# Patient Record
Sex: FEMALE | Race: WHITE | NOT HISPANIC OR LATINO
[De-identification: names, ages, dates, MRNs, and addresses within clinical notes are randomized per-mention and may not be internally consistent; named-entity substitution may affect disease eponyms.]

---

## 2020-01-09 ENCOUNTER — APPOINTMENT (OUTPATIENT)
Dept: MATERNAL FETAL MEDICINE | Facility: CLINIC | Age: 40
End: 2020-01-09
Payer: MEDICAID

## 2020-01-09 VITALS
OXYGEN SATURATION: 100 % | WEIGHT: 125 LBS | HEART RATE: 94 BPM | DIASTOLIC BLOOD PRESSURE: 65 MMHG | SYSTOLIC BLOOD PRESSURE: 110 MMHG | HEIGHT: 61 IN | BODY MASS INDEX: 23.6 KG/M2 | RESPIRATION RATE: 16 BRPM

## 2020-01-09 DIAGNOSIS — Z83.3 FAMILY HISTORY OF DIABETES MELLITUS: ICD-10-CM

## 2020-01-09 DIAGNOSIS — Z82.49 FAMILY HISTORY OF ISCHEMIC HEART DISEASE AND OTHER DISEASES OF THE CIRCULATORY SYSTEM: ICD-10-CM

## 2020-01-09 DIAGNOSIS — Z78.9 OTHER SPECIFIED HEALTH STATUS: ICD-10-CM

## 2020-01-09 DIAGNOSIS — F41.9 ANXIETY DISORDER, UNSPECIFIED: ICD-10-CM

## 2020-01-09 DIAGNOSIS — O09.522 SUPERVISION OF ELDERLY MULTIGRAVIDA, SECOND TRIMESTER: ICD-10-CM

## 2020-01-09 PROCEDURE — 99205 OFFICE O/P NEW HI 60 MIN: CPT

## 2020-01-16 ENCOUNTER — APPOINTMENT (OUTPATIENT)
Dept: ANTEPARTUM | Facility: CLINIC | Age: 40
End: 2020-01-16
Payer: MEDICAID

## 2020-01-16 ENCOUNTER — ASOB RESULT (OUTPATIENT)
Age: 40
End: 2020-01-16

## 2020-01-16 PROCEDURE — 76811 OB US DETAILED SNGL FETUS: CPT

## 2020-02-17 RX ORDER — BUSPIRONE HYDROCHLORIDE 5 MG/1
5 TABLET ORAL 3 TIMES DAILY
Qty: 90 | Refills: 1 | Status: ACTIVE | COMMUNITY
Start: 2020-02-17 | End: 1900-01-01

## 2020-02-18 PROBLEM — O09.522 ADVANCED MATERNAL AGE IN MULTIGRAVIDA, SECOND TRIMESTER: Status: ACTIVE | Noted: 2020-02-18

## 2020-02-18 PROBLEM — Z78.9 DENIES ALCOHOL CONSUMPTION: Status: ACTIVE | Noted: 2020-02-18

## 2020-02-18 PROBLEM — Z78.9 DOES NOT USE ILLICIT DRUGS: Status: ACTIVE | Noted: 2020-02-18

## 2020-02-18 PROBLEM — Z82.49 FAMILY HISTORY OF HYPERTENSION: Status: ACTIVE | Noted: 2020-02-18

## 2020-02-18 PROBLEM — Z83.3 FAMILY HISTORY OF TYPE 2 DIABETES MELLITUS: Status: ACTIVE | Noted: 2020-02-18

## 2020-02-18 PROBLEM — F41.9 ANXIETY: Status: ACTIVE | Noted: 2020-02-17

## 2020-02-18 RX ORDER — INSULIN LISPRO 100 U/ML
100 INJECTION, SOLUTION SUBCUTANEOUS
Refills: 0 | Status: ACTIVE | COMMUNITY

## 2020-02-18 RX ORDER — INSULIN GLARGINE 100 [IU]/ML
INJECTION, SOLUTION SUBCUTANEOUS
Refills: 0 | Status: ACTIVE | COMMUNITY

## 2020-02-18 RX ORDER — BUSPIRONE HCL 5 MG
TABLET ORAL
Refills: 0 | Status: ACTIVE | COMMUNITY

## 2020-02-18 NOTE — ACTIVE PROBLEMS
[Hypertension] : no hypertension [Heart Disease] : no heart disease [Autoimmune Disease] : no autoimmune disease [Renal Disease] : no kidney disease, no UTI [Neurologic Disorder] : no neurologic disorder, no epilepsy [Psychiatric Disorders] : no psychiatric disorders [Hepatic Disorder] : no hepatitis, no liver disease [Thrombophlebitis] : no varicosities, no phlebitis [Thyroid Disorder] : no thyroid dysfunction [Blood Transfusion (___ Ml)] : no history of blood transfusion [Trauma] : no trauma/violence [FreeTextEntry1] : Ms. Gray reports a recent normal ophthalmology evaluation but records are unavailable for review. \par \par

## 2020-02-18 NOTE — SURGICAL HISTORY
[Fibroids] : no fibroids [Abn Paps] : no abnormal pap smears [Breast Disease] : no breast disease [STI's] : no STI's [Cysts] : no cysts [Infertility] : no infertility [OC Use] : no OC use

## 2020-02-18 NOTE — PAST MEDICAL HISTORY
[HIV Infection] : no HIV [Chlamydial Infections] : no chlamydia [Exposure To Gonorrhea] : no gonorrhea [Herpes Simplex] : no genital herpes [Syphilis] : no syphilis [Human Papilloma Virus Infection] : no genital warts [Hepatitis, B Virus] : no Hepatitis B [Hepatitis, C Virus] : no Hepatitis C [Trichomoniasis] : no trichomoniasis

## 2020-02-18 NOTE — HISTORY OF PRESENT ILLNESS
[FreeTextEntry1] : Thank you for referring Ms. Ella Gray for Maternal Fetal Medicine pregnancy consultation. She is a 39 year old  LMP 2019 BOSSMAN 2020 at 20 weeks gestational age with preexisting diabetes. She has been using insulin at the recommendation of her endocrinologist since May 2019 (prior to conception) and is currently using Admelog with an insulin to carbohydrate ratio of 1 to 8 with meals (reports between 4 - 8 units depending on carbohydrate count) and Basaglar (long acting glargine) 14 units at bedtime. She was diagnosed with diabetes in  after her first pregnancy and has been taking insulin intermittently along with oral hypoglycemics since that time. \par \par Her medical history is otherwise insignificant and she has never had surgery. She denies a personal or family history of venous thromboembolic disease. \par \par She is currently taking insulin (as above), prenatal vitamins, and buspirone as needed to control anxiety symptoms. She has no known drug allergies. 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 She is in a supportive marriage. She denies sleep disturbances or anxiety symptoms that interrupt her every day activities. She has never had a blood transfusion but has no objection to blood transfusion in case of emergency. She denies recent travel outside of the [place]. \par \par Her family medical history is significant for Type 2 Diabetes in her father and hypertension in her mother and grandparents. She denies a family history of breast, uterine, or ovarian cancer. She denies a family history of congenital anomalies or chromosomal abnormalities and her partner's history is also negative to her knowledge. \par \par \par Ms. Gray is not keeping a glucose log but brought her meter to our visit today for review. She is checking fingersticks multiple times after each meal and multiple times overnight. She frequently has elevations after having snacks or drinks to correct hypoglycemia, has elevations, and then covers her elevations with short acting insulin dosing. Her fingersticks are generally well controlled with some postprandial hyperglycemia, occasional fasting hyperglycemia, and many low values. \par \par \par  [Patient travelled to an area with active Zika Virus transmission during pregnancy or 8 weeks before getting pregnant] : Patient stated she did not travel to an area with active Zika virus transmission during pregnancy or 8 weeks before getting pregnant [Patient had sex without a condom with a man who traveled to, or reside in, an area with active Zika Virus transmission during pregnancy] : Patient did not have sex without a condom with a man who traveled to, or reside in, an area with active Zika Virus transmission during pregnancy

## 2020-02-18 NOTE — OB HISTORY
[Pregnancy History] : patient received anesthesia [___] : pregnancy complications occured [LMP: ___] : LMP: [unfilled] [BOSSMAN: ___] : BOSSMAN: [unfilled] [Spontaneous] : Spontaneous conception [EGA: ___ wks] : EGA: [unfilled] wks [de-identified] : using insulin for glycemic control.  [FreeTextEntry1] : \par

## 2020-02-18 NOTE — DISCUSSION/SUMMARY
[FreeTextEntry1] : Her problems and my suggestions for her management are summarized below.  She was extensively counseled regarding the following issues:\par \par Preexisting diabetes mellitus affecting pregnancy:\par \par \par Fetal complications of uncontrolled diabetes in pregnancy include increased risk of macrosomia, polyhydramnios, need for  delivery and associated complications, and stillbirth.  risks include hypoglycemia, hyperbilirubinemia, and respiratory complications. Obstetric complications include increased risk of hypertensive disorders of pregnancy including preeclampsia,  delivery and peripartum hemorrhage.\par \par There is no evidence of end-organ disease in Ms. Cruz case based on available records. A maternal echocardiogram can be considered as she has had diabetes for more than 10 years. \par \par Adequate glycemic control in pregnancies complicated by gestational diabetes reduces the risk of fetal, , and obstetrical complications. Ms. Gray is extremely motivated to achieve excellent glucose control to reduce her risks in pregnancy. I explained how and when she should be checking fingersticks. She was provided with a glucose log and instructions to monitor her fingersticks and bring this log to each visit and to email to me for remote review. I explained that fasting fingerstick goals are between 60 - 90 mg/dL, while 1 hour postprandial fingerstick goals are less than 140 mg/dL OR 2 hour postprandial fingerstick goals less than 120 mg/dL. I reviewed that six times daily testing is suggested. Ms. Gray is currently checking her fingersticks multiple times per day (sometimes hourly) and is covering elevations with short-acting insulin which leads to an overcorrection and hypoglycemia. I explained that short-acting insulin should not be taken without food or to correct postprandial fingersticks as a sliding scale because of the risk of hypoglycemia and its potential adverse effects. The signs and symptoms of hypoglycemia were reviewed. \par \par I suggested that Ms. Gray HALVE her current meal coverage and adjust her insulin to carbohydrate ratio to 1: 16. She will continue taking 14 units of long acting insulin at night to control fasting hyperglycemia. \par \par Nutritional choices that may impact her ability to achieve tight glycemic control were reviewed and handouts outlining low-glycemic meal and snack choices were provided. I gave her ACOG FAQ sheets on gestational diabetes and nutrition in pregnancy. In addition, I reviewed that 30 minutes of daily low-impact activity such as walking, stretching, or yoga may help her achieve improved glycemic control. \par \par Ms. Gray has plans for a fetal anatomical survey in your office and fetal echocardiogram has been scheduled. Serial ultrasounds to measure interval fetal growth and  testing in the third trimester is suggested. Ms. Gray may come to my office for these appointments or can have ultrasounds with the [facility] team in your office. \par \par Women with diabetes have a higher risk of developing preeclampsia, and so close maternal surveillance and blood pressure monitoring is recommended. The signs and symptoms of preeclampsia were reviewed today. In addition, daily low-dose aspirin is recommended for preeclampsia prevention and I encouraged Ms. Gray to start taking aspirin daily. \par \par Delivery at 39 weeks gestational age is reasonable if improved control is attained on insulin regimen, otherwise, 37 – 38 week delivery can be considered. Discussion of risks and benefits of scheduled primary  delivery can be considered if estimated fetal weight is 4500 grams or greater. \par \par I discussed with Ms. Gray that I am able to monitor her fingerstick glucose values via email but that she will need to come for visits with me once per trimester so that I can review her logs and provide recommendations for insulin changes and postpartum insulin plan. She is in agreement with this plan. She has my contact information and understands that I am available in the interim if needed. \par \par \par At the end of our visit, the patient indicated that her questions were answered and she seemed satisfied with our discussion. Approximately 80 minutes were spent with the patient with more than 50% of the time in face to face consultation.  Please do not hesitate to contact me with any questions.\par \par \par \par \par

## 2020-02-18 NOTE — DATA REVIEWED
[FreeTextEntry1] : 10/8/2019 labs:\par BUN / Cr 22 / 0.61 mg/dL\par AST / ALT 17 / 14 U/L\par platelet count 174 K/uL\par HBsAg NR\par Measles immune\par Varicella immune\par Rubella immune\par Hemoglobin A1C 5.8%\par A pos, antibody screen negative\par HIV NR\par \par 11/25/2019 labs:\par 24 hour urine collection for protein 132 mg/24 hours\par H/H 10.5 / 31.9 g/dL\par Hemoglobin A1C 5.6%\par

## 2020-02-18 NOTE — REVIEW OF SYSTEMS
[Sleep Disturbances] : no sleep disturbances [Anxiety] : anxiety [Depression] : no depression [Nl] : Hematologic/Lymphatic

## 2020-04-01 DIAGNOSIS — O24.319 UNSPECIFIED PRE-EXISTING DIABETES MELLITUS IN PREGNANCY, UNSPECIFIED TRIMESTER: ICD-10-CM

## 2020-04-26 ENCOUNTER — FORM ENCOUNTER (OUTPATIENT)
Age: 40
End: 2020-04-26

## 2020-05-12 ENCOUNTER — INPATIENT (INPATIENT)
Facility: HOSPITAL | Age: 40
LOS: 1 days | Discharge: HOME | End: 2020-05-14
Attending: OBSTETRICS & GYNECOLOGY | Admitting: OBSTETRICS & GYNECOLOGY
Payer: MEDICAID

## 2020-05-12 RX ORDER — OXYTOCIN 10 UNIT/ML
333.33 VIAL (ML) INJECTION
Qty: 20 | Refills: 0 | Status: DISCONTINUED | OUTPATIENT
Start: 2020-05-12 | End: 2020-05-14

## 2020-05-12 RX ORDER — SODIUM CHLORIDE 9 MG/ML
1000 INJECTION, SOLUTION INTRAVENOUS
Refills: 0 | Status: DISCONTINUED | OUTPATIENT
Start: 2020-05-12 | End: 2020-05-13

## 2020-05-13 VITALS
DIASTOLIC BLOOD PRESSURE: 73 MMHG | HEART RATE: 88 BPM | HEIGHT: 61 IN | TEMPERATURE: 98 F | RESPIRATION RATE: 20 BRPM | SYSTOLIC BLOOD PRESSURE: 106 MMHG | WEIGHT: 132.94 LBS

## 2020-05-13 LAB
AMPHET UR-MCNC: NEGATIVE — SIGNIFICANT CHANGE UP
APPEARANCE UR: CLEAR — SIGNIFICANT CHANGE UP
BACTERIA # UR AUTO: NEGATIVE — SIGNIFICANT CHANGE UP
BARBITURATES UR SCN-MCNC: NEGATIVE — SIGNIFICANT CHANGE UP
BASOPHILS # BLD AUTO: 0.02 K/UL — SIGNIFICANT CHANGE UP (ref 0–0.2)
BASOPHILS # BLD AUTO: 0.03 K/UL — SIGNIFICANT CHANGE UP (ref 0–0.2)
BASOPHILS NFR BLD AUTO: 0.4 % — SIGNIFICANT CHANGE UP (ref 0–1)
BASOPHILS NFR BLD AUTO: 0.4 % — SIGNIFICANT CHANGE UP (ref 0–1)
BENZODIAZ UR-MCNC: NEGATIVE — SIGNIFICANT CHANGE UP
BILIRUB UR-MCNC: NEGATIVE — SIGNIFICANT CHANGE UP
BLD GP AB SCN SERPL QL: SIGNIFICANT CHANGE UP
BUPRENORPHINE SCREEN, URINE RESULT: NEGATIVE — SIGNIFICANT CHANGE UP
COCAINE METAB.OTHER UR-MCNC: NEGATIVE — SIGNIFICANT CHANGE UP
COD CRY URNS QL: ABNORMAL
COLOR SPEC: YELLOW — SIGNIFICANT CHANGE UP
DIFF PNL FLD: ABNORMAL
EOSINOPHIL # BLD AUTO: 0.07 K/UL — SIGNIFICANT CHANGE UP (ref 0–0.7)
EOSINOPHIL # BLD AUTO: 0.15 K/UL — SIGNIFICANT CHANGE UP (ref 0–0.7)
EOSINOPHIL NFR BLD AUTO: 1.3 % — SIGNIFICANT CHANGE UP (ref 0–8)
EOSINOPHIL NFR BLD AUTO: 2 % — SIGNIFICANT CHANGE UP (ref 0–8)
EPI CELLS # UR: 5 /HPF — SIGNIFICANT CHANGE UP (ref 0–5)
FENTANYL UR QL: NEGATIVE — SIGNIFICANT CHANGE UP
GLUCOSE BLDC GLUCOMTR-MCNC: 107 MG/DL — HIGH (ref 70–99)
GLUCOSE BLDC GLUCOMTR-MCNC: 119 MG/DL — HIGH (ref 70–99)
GLUCOSE BLDC GLUCOMTR-MCNC: 95 MG/DL — SIGNIFICANT CHANGE UP (ref 70–99)
GLUCOSE UR QL: SIGNIFICANT CHANGE UP
HCT VFR BLD CALC: 33.4 % — LOW (ref 37–47)
HCT VFR BLD CALC: 33.5 % — LOW (ref 37–47)
HGB BLD-MCNC: 11.2 G/DL — LOW (ref 12–16)
HGB BLD-MCNC: 11.3 G/DL — LOW (ref 12–16)
HYALINE CASTS # UR AUTO: 2 /LPF — SIGNIFICANT CHANGE UP (ref 0–7)
IMM GRANULOCYTES NFR BLD AUTO: 0.2 % — SIGNIFICANT CHANGE UP (ref 0.1–0.3)
IMM GRANULOCYTES NFR BLD AUTO: 0.3 % — SIGNIFICANT CHANGE UP (ref 0.1–0.3)
KETONES UR-MCNC: SIGNIFICANT CHANGE UP
L&D DRUG SCREEN, URINE: SIGNIFICANT CHANGE UP
LEUKOCYTE ESTERASE UR-ACNC: NEGATIVE — SIGNIFICANT CHANGE UP
LYMPHOCYTES # BLD AUTO: 1.28 K/UL — SIGNIFICANT CHANGE UP (ref 1.2–3.4)
LYMPHOCYTES # BLD AUTO: 1.52 K/UL — SIGNIFICANT CHANGE UP (ref 1.2–3.4)
LYMPHOCYTES # BLD AUTO: 20.5 % — SIGNIFICANT CHANGE UP (ref 20.5–51.1)
LYMPHOCYTES # BLD AUTO: 24.2 % — SIGNIFICANT CHANGE UP (ref 20.5–51.1)
MCHC RBC-ENTMCNC: 31.4 PG — HIGH (ref 27–31)
MCHC RBC-ENTMCNC: 31.4 PG — HIGH (ref 27–31)
MCHC RBC-ENTMCNC: 33.5 G/DL — SIGNIFICANT CHANGE UP (ref 32–37)
MCHC RBC-ENTMCNC: 33.7 G/DL — SIGNIFICANT CHANGE UP (ref 32–37)
MCV RBC AUTO: 93.1 FL — SIGNIFICANT CHANGE UP (ref 81–99)
MCV RBC AUTO: 93.6 FL — SIGNIFICANT CHANGE UP (ref 81–99)
METHADONE UR-MCNC: NEGATIVE — SIGNIFICANT CHANGE UP
MONOCYTES # BLD AUTO: 0.38 K/UL — SIGNIFICANT CHANGE UP (ref 0.1–0.6)
MONOCYTES # BLD AUTO: 0.48 K/UL — SIGNIFICANT CHANGE UP (ref 0.1–0.6)
MONOCYTES NFR BLD AUTO: 6.5 % — SIGNIFICANT CHANGE UP (ref 1.7–9.3)
MONOCYTES NFR BLD AUTO: 7.2 % — SIGNIFICANT CHANGE UP (ref 1.7–9.3)
NEUTROPHILS # BLD AUTO: 3.52 K/UL — SIGNIFICANT CHANGE UP (ref 1.4–6.5)
NEUTROPHILS # BLD AUTO: 5.22 K/UL — SIGNIFICANT CHANGE UP (ref 1.4–6.5)
NEUTROPHILS NFR BLD AUTO: 66.7 % — SIGNIFICANT CHANGE UP (ref 42.2–75.2)
NEUTROPHILS NFR BLD AUTO: 70.3 % — SIGNIFICANT CHANGE UP (ref 42.2–75.2)
NITRITE UR-MCNC: NEGATIVE — SIGNIFICANT CHANGE UP
NRBC # BLD: 0 /100 WBCS — SIGNIFICANT CHANGE UP (ref 0–0)
NRBC # BLD: 0 /100 WBCS — SIGNIFICANT CHANGE UP (ref 0–0)
OPIATES UR-MCNC: NEGATIVE — SIGNIFICANT CHANGE UP
OXYCODONE UR-MCNC: NEGATIVE — SIGNIFICANT CHANGE UP
PCP UR-MCNC: NEGATIVE — SIGNIFICANT CHANGE UP
PH UR: 6 — SIGNIFICANT CHANGE UP (ref 5–8)
PLATELET # BLD AUTO: 115 K/UL — LOW (ref 130–400)
PLATELET # BLD AUTO: 119 K/UL — LOW (ref 130–400)
PRENATAL SYPHILIS TEST: SIGNIFICANT CHANGE UP
PROPOXYPHENE QUALITATIVE URINE RESULT: NEGATIVE — SIGNIFICANT CHANGE UP
PROT UR-MCNC: ABNORMAL
RBC # BLD: 3.57 M/UL — LOW (ref 4.2–5.4)
RBC # BLD: 3.6 M/UL — LOW (ref 4.2–5.4)
RBC # FLD: 13.6 % — SIGNIFICANT CHANGE UP (ref 11.5–14.5)
RBC # FLD: 13.8 % — SIGNIFICANT CHANGE UP (ref 11.5–14.5)
RBC CASTS # UR COMP ASSIST: 5 /HPF — HIGH (ref 0–4)
SARS-COV-2 RNA SPEC QL NAA+PROBE: SIGNIFICANT CHANGE UP
SP GR SPEC: 1.03 — HIGH (ref 1.01–1.02)
UROBILINOGEN FLD QL: SIGNIFICANT CHANGE UP
WBC # BLD: 5.28 K/UL — SIGNIFICANT CHANGE UP (ref 4.8–10.8)
WBC # BLD: 7.42 K/UL — SIGNIFICANT CHANGE UP (ref 4.8–10.8)
WBC # FLD AUTO: 5.28 K/UL — SIGNIFICANT CHANGE UP (ref 4.8–10.8)
WBC # FLD AUTO: 7.42 K/UL — SIGNIFICANT CHANGE UP (ref 4.8–10.8)
WBC UR QL: 2 /HPF — SIGNIFICANT CHANGE UP (ref 0–5)

## 2020-05-13 PROCEDURE — 59400 OBSTETRICAL CARE: CPT | Mod: U7

## 2020-05-13 RX ORDER — SIMETHICONE 80 MG/1
80 TABLET, CHEWABLE ORAL EVERY 4 HOURS
Refills: 0 | Status: DISCONTINUED | OUTPATIENT
Start: 2020-05-13 | End: 2020-05-14

## 2020-05-13 RX ORDER — SODIUM CHLORIDE 9 MG/ML
3 INJECTION INTRAMUSCULAR; INTRAVENOUS; SUBCUTANEOUS EVERY 8 HOURS
Refills: 0 | Status: DISCONTINUED | OUTPATIENT
Start: 2020-05-13 | End: 2020-05-14

## 2020-05-13 RX ORDER — DIBUCAINE 1 %
1 OINTMENT (GRAM) RECTAL EVERY 6 HOURS
Refills: 0 | Status: DISCONTINUED | OUTPATIENT
Start: 2020-05-13 | End: 2020-05-14

## 2020-05-13 RX ORDER — DIPHENHYDRAMINE HCL 50 MG
25 CAPSULE ORAL EVERY 6 HOURS
Refills: 0 | Status: DISCONTINUED | OUTPATIENT
Start: 2020-05-13 | End: 2020-05-14

## 2020-05-13 RX ORDER — AER TRAVELER 0.5 G/1
1 SOLUTION RECTAL; TOPICAL EVERY 4 HOURS
Refills: 0 | Status: DISCONTINUED | OUTPATIENT
Start: 2020-05-13 | End: 2020-05-14

## 2020-05-13 RX ORDER — LANOLIN
1 OINTMENT (GRAM) TOPICAL EVERY 6 HOURS
Refills: 0 | Status: DISCONTINUED | OUTPATIENT
Start: 2020-05-13 | End: 2020-05-14

## 2020-05-13 RX ORDER — IBUPROFEN 200 MG
600 TABLET ORAL EVERY 6 HOURS
Refills: 0 | Status: DISCONTINUED | OUTPATIENT
Start: 2020-05-13 | End: 2020-05-14

## 2020-05-13 RX ORDER — OXYCODONE HYDROCHLORIDE 5 MG/1
5 TABLET ORAL ONCE
Refills: 0 | Status: DISCONTINUED | OUTPATIENT
Start: 2020-05-13 | End: 2020-05-14

## 2020-05-13 RX ORDER — OXYTOCIN 10 UNIT/ML
333.33 VIAL (ML) INJECTION
Qty: 20 | Refills: 0 | Status: DISCONTINUED | OUTPATIENT
Start: 2020-05-13 | End: 2020-05-14

## 2020-05-13 RX ORDER — OXYTOCIN 10 UNIT/ML
2 VIAL (ML) INJECTION
Qty: 30 | Refills: 0 | Status: DISCONTINUED | OUTPATIENT
Start: 2020-05-13 | End: 2020-05-14

## 2020-05-13 RX ORDER — HYDROCORTISONE 1 %
1 OINTMENT (GRAM) TOPICAL EVERY 6 HOURS
Refills: 0 | Status: DISCONTINUED | OUTPATIENT
Start: 2020-05-13 | End: 2020-05-14

## 2020-05-13 RX ORDER — MAGNESIUM HYDROXIDE 400 MG/1
30 TABLET, CHEWABLE ORAL
Refills: 0 | Status: DISCONTINUED | OUTPATIENT
Start: 2020-05-13 | End: 2020-05-14

## 2020-05-13 RX ORDER — BENZOCAINE 10 %
1 GEL (GRAM) MUCOUS MEMBRANE EVERY 6 HOURS
Refills: 0 | Status: DISCONTINUED | OUTPATIENT
Start: 2020-05-13 | End: 2020-05-14

## 2020-05-13 RX ORDER — INFLUENZA VIRUS VACCINE 15; 15; 15; 15 UG/.5ML; UG/.5ML; UG/.5ML; UG/.5ML
0.5 SUSPENSION INTRAMUSCULAR ONCE
Refills: 0 | Status: DISCONTINUED | OUTPATIENT
Start: 2020-05-13 | End: 2020-05-13

## 2020-05-13 RX ORDER — OXYCODONE HYDROCHLORIDE 5 MG/1
5 TABLET ORAL
Refills: 0 | Status: DISCONTINUED | OUTPATIENT
Start: 2020-05-13 | End: 2020-05-14

## 2020-05-13 RX ORDER — PRAMOXINE HYDROCHLORIDE 150 MG/15G
1 AEROSOL, FOAM RECTAL EVERY 4 HOURS
Refills: 0 | Status: DISCONTINUED | OUTPATIENT
Start: 2020-05-13 | End: 2020-05-14

## 2020-05-13 RX ORDER — IBUPROFEN 200 MG
600 TABLET ORAL EVERY 6 HOURS
Refills: 0 | Status: COMPLETED | OUTPATIENT
Start: 2020-05-13 | End: 2021-04-11

## 2020-05-13 RX ORDER — KETOROLAC TROMETHAMINE 30 MG/ML
30 SYRINGE (ML) INJECTION ONCE
Refills: 0 | Status: DISCONTINUED | OUTPATIENT
Start: 2020-05-13 | End: 2020-05-13

## 2020-05-13 RX ORDER — ACETAMINOPHEN 500 MG
975 TABLET ORAL
Refills: 0 | Status: DISCONTINUED | OUTPATIENT
Start: 2020-05-13 | End: 2020-05-14

## 2020-05-13 RX ADMIN — Medication 600 MILLIGRAM(S): at 16:25

## 2020-05-13 RX ADMIN — Medication 975 MILLIGRAM(S): at 18:34

## 2020-05-13 RX ADMIN — SODIUM CHLORIDE 3 MILLILITER(S): 9 INJECTION INTRAMUSCULAR; INTRAVENOUS; SUBCUTANEOUS at 16:05

## 2020-05-13 RX ADMIN — Medication 600 MILLIGRAM(S): at 22:14

## 2020-05-13 RX ADMIN — Medication 30 MILLIGRAM(S): at 09:25

## 2020-05-13 RX ADMIN — SODIUM CHLORIDE 3 MILLILITER(S): 9 INJECTION INTRAMUSCULAR; INTRAVENOUS; SUBCUTANEOUS at 22:14

## 2020-05-13 RX ADMIN — Medication 975 MILLIGRAM(S): at 12:33

## 2020-05-13 NOTE — OB PROVIDER H&P - ALERT: PERTINENT HISTORY
1st Trimester Sonogram/20 Week Level II Sonogram/BioPhysical Profile(s)/Follow up Sonogram for Growth/Fetal Non-Stress Test (NST)/Fetal Sonogram

## 2020-05-13 NOTE — OB PROVIDER H&P - ASSESSMENT
40yo  @38w1d, GBS neg, grand multipara, for IOL for poorly-controlled GDMA2  -admit to L&D  -cont efm/toco  -clear liquid diet, IVF  -FS q4h in latent labor, q2h in active labor  -f/u admission labs  -vitals per routine    Dr. Benson and Dr. Martinez aware. 38yo  @38w1d, GBS neg, grand multipara, mild polyhydramnios, for IOL for poorly-controlled GDMA2  -admit to L&D  -cont efm/toco  -clear liquid diet, IVF  -FS q4h in latent labor, q2h in active labor  -f/u admission labs  -vitals per routine    Dr. Benson and Dr. Martinez aware.

## 2020-05-13 NOTE — PROGRESS NOTE ADULT - SUBJECTIVE AND OBJECTIVE BOX
PGY 2 Note    S: Pt seen and examined at bedside. Doing well, experiencing pain with contractions.     Vital Signs Last 24 Hrs  T(F): 98.4 (13 May 2020 00:37), Max: 98.4 (13 May 2020 00:37)  HR: 72 (13 May 2020 03:44) (72 - 88)  BP: 100/63 (13 May 2020 03:44) (75/42 - 106/73)  RR: 20 (13 May 2020 00:37) (20 - 20)    EFM: 110/mod bartolo/+accel  TOCO: q5-6min  SVE: 6//-2, vtx, intact, balloon removed    Labs:                        11.3   5.28  )-----------( 119      ( 13 May 2020 00:40 )             33.5             ABO RH Interpretation: A POS (20 @ 00:40)    Urinalysis Basic - ( 13 May 2020 00:40 )    Color: Yellow / Appearance: Clear / S.033 / pH: x  Gluc: x / Ketone: Trace  / Bili: Negative / Urobili: <2 mg/dL   Blood: x / Protein: 30 mg/dL / Nitrite: Negative   Leuk Esterase: Negative / RBC: 5 /HPF / WBC 2 /HPF   Sq Epi: x / Non Sq Epi: 5 /HPF / Bacteria: Negative        Prenatal Syphilis Test: Nonreact (20 @ 00:40)      Meds:

## 2020-05-13 NOTE — OB RN PATIENT PROFILE - NS_GESTAGE_OBGYN_ALL_OB_FT
Detail Level: Generalized
Quality 431: Preventive Care And Screening: Unhealthy Alcohol Use - Screening: Patient screened for unhealthy alcohol use using a single question and scores less than 2 times per year
Quality 226: Preventive Care And Screening: Tobacco Use: Screening And Cessation Intervention: Tobacco Screening not Performed for Medical Reasons
Quality 130: Documentation Of Current Medications In The Medical Record: Current Medications Documented
38w1d

## 2020-05-13 NOTE — OB PROVIDER H&P - NS_OBGYNHISTORY_OBGYN_ALL_OB_FT
OB: FT  x4, largest 6-13, GDMA2 in all pregnancies, otherwise no complications    Gyn: denies fibroids, cysts, abnormal pap smears, STDs. OB: FT  x4, largest 6-13, GDM in all pregnancies, otherwise no complications    Gyn: denies fibroids, cysts, abnormal pap smears, STDs.

## 2020-05-13 NOTE — OB PROVIDER H&P - NSHPLABSRESULTS_GEN_ALL_CORE
Labs:  10/  HbA1C 5.8%  measles immune  varicella immune  rubella immune  mumps immune    12/23  bile acids wnl    Sono:  11/25: 13w6d, viable IUP  1/16: AGA 77%, AFV normal, ant placenta, normal anatomy  2/10: EFW 809g (66%), MVP 4.4cm, AGA  3/5: 28w2d, breech, MVP 5.49cm, EFW 1295g (59%), BPP 8/8  4/2: 33w6d, vtx, MVP 4.1cm, BPP 8/8  4/20: 34w6d, BPP 8/8, MVP 5.2cm  4/23: 35w2d, vtx, BPP 8/8, MVP 6.8cm, EFW 2523g (35%)  4/27: 36w6d, MVP 5mc, vtx  4/30: 36w2d, MVP 8.6cm, BPP 8/8, vtx Labs:  10/8  HbA1C 5.8%  measles immune  varicella immune  rubella immune  mumps immune    11/25  24hr     12/23  bile acids wnl    Sono:  11/25: 13w6d, viable IUP  1/16: AGA 77%, AFV normal, ant placenta, normal anatomy  2/10: EFW 809g (66%), MVP 4.4cm, AGA  3/5: 28w2d, breech, MVP 5.49cm, EFW 1295g (59%), BPP 8/8  4/2: 33w6d, vtx, MVP 4.1cm, BPP 8/8  4/20: 34w6d, BPP 8/8, MVP 5.2cm  4/23: 35w2d, vtx, BPP 8/8, MVP 6.8cm, EFW 2523g (35%)  4/27: 36w6d, MVP 5mc, vtx  4/30: 36w2d, MVP 8.6cm, BPP 8/8, vtx

## 2020-05-13 NOTE — OB PROVIDER H&P - ATTENDING COMMENTS
40yo  @38w1d, GBS neg, for IOL for  GDMA2  -admit   -iv access  -cxv balloon to be placed  -BS q 2 hr

## 2020-05-13 NOTE — CHART NOTE - NSCHARTNOTEFT_GEN_A_CORE
Patient examined at bedside, cervical ripening balloon placed 80/80.  Patient denies any complaints.      Plan:  -cont efm/toco  -clear liquid diet, IVF  -pain management prn  -f/u admission labs, COVID-19 swab    Dr. Benson and Dr. Martinez aware.

## 2020-05-13 NOTE — PROGRESS NOTE ADULT - ASSESSMENT
38 yo  @38w1d, GBS neg, grand multip, AMA, IOL for poorly controlled GDMA2, s/p cervical balloon, doing well.    - cont EFM and toco  - epidural for pain management  - clear liquid diet, IV hydration  - monitor vitals  - f/up FS  - f/up UDS    Dr. Martinez and Dr. Benson aware.

## 2020-05-13 NOTE — OB PROVIDER H&P - HISTORY OF PRESENT ILLNESS
38yo  @38w1d with BOSSMAN  by LMP  consistent with first trimester sonogram presenting to L&D for IOL for GDMA2.  Denies VB, LOF, ctx.  Good FM.  FFS ranging 60-90 and PPFS 140s.  Current insulin regimen is 19u basaglar at bedtime and admalog 1u per 8g carbs with meals. Fetal echo within normal limits.  Did not take any medications for diabetes prior to pregnancy.  With mild polyhydramnios, last MVP on  was 8.6cm. Denies any other complications with this pregnancy.  GBS neg. 40yo  @38w1d with BOSSMAN  by LMP  consistent with first trimester sonogram presenting to L&D for IOL for GDMA2.  Denies VB, LOF, ctx.  Good FM.  FFS ranging 60-90 and PPFS 140s.  Current insulin regimen is 19u basaglar at bedtime and admalog 1u per 8g carbs with meals. Fetal echo within normal limits.  Did not take any medications for diabetes prior to pregnancy.  With mild polyhydramnios, last MVP on  was 8.6cm. Last cervical exam yesterday, patient was 1cm dilated. Denies any other complications with this pregnancy.  GBS neg. 40yo  @38w1d with BOSSMAN  by LMP  consistent with first trimester sonogram presenting to L&D for IOL for T2DM.  Denies VB, LOF, ctx.  Good FM.  FFS ranging 60-90 and PPFS 140s.  Current insulin regimen is 19u basaglar at bedtime and admalog 1u per 8g carbs with meals. Fetal echo within normal limits.  Did not take any medications for diabetes prior to pregnancy.  With mild polyhydramnios, last MVP on  was 8.6cm. Last cervical exam yesterday, patient was 1cm dilated. Denies any other complications with this pregnancy.  GBS neg.

## 2020-05-13 NOTE — OB PROVIDER H&P - NSHPPHYSICALEXAM_GEN_ALL_CORE
Vitals pending    Gen: NAD, sitting comfortably  Abd: Gravid, soft, NT, palpable ctx  SVE: ,vtx, intact  EFM: /mod/+accels  [place]: Q m  Sono: Vital Signs Last 24 Hrs  HR: 88 (13 May 2020 00:42) (88 - 88)  BP: 106/73 (13 May 2020 00:42) (106/73 - 106/73)    FS: 107    Gen: NAD, sitting comfortably  Abd: Gravid, soft, NT, no palpable ctx  SVE: 1/0/-3, vtx, intact  EFM: 120/mod/+accels, cat I  [place]: occasional contractions  Sono: cephalic, ant placenta

## 2020-05-13 NOTE — OB PROVIDER DELIVERY SUMMARY - NSPROVIDERDELIVERYNOTE_OBGYN_ALL_OB_FT
Patient was fully dilated and pushed. NSD live male , Apgars 9/9. Vtx delivered OA ,Fetal head restituted to LOT. The anterior and posterior shoulders delivered, followed by the remaining body atraumatically. Delayed cord clamping was performed, and then clamped and cut. Cord blood & gases collected. The  was handed to mother for skin to skin. The placenta delivered spontaneously intact with 3v cord. Uterus massaged and firm with oxytocin given IV. Cervix, vagina and perineum inspected   . Repair of a first degree laceration, in the usual fashion with 2-0 chromic.   EBL -300cc, hemostasis assured.

## 2020-05-14 ENCOUNTER — TRANSCRIPTION ENCOUNTER (OUTPATIENT)
Age: 40
End: 2020-05-14

## 2020-05-14 VITALS
TEMPERATURE: 98 F | HEART RATE: 59 BPM | DIASTOLIC BLOOD PRESSURE: 51 MMHG | RESPIRATION RATE: 18 BRPM | SYSTOLIC BLOOD PRESSURE: 103 MMHG

## 2020-05-14 LAB — GLUCOSE BLDC GLUCOMTR-MCNC: 114 MG/DL — HIGH (ref 70–99)

## 2020-05-14 RX ORDER — ACETAMINOPHEN 500 MG
3 TABLET ORAL
Qty: 0 | Refills: 0 | DISCHARGE
Start: 2020-05-14

## 2020-05-14 RX ORDER — IBUPROFEN 200 MG
1 TABLET ORAL
Qty: 0 | Refills: 0 | DISCHARGE
Start: 2020-05-14

## 2020-05-14 RX ORDER — BENZOCAINE 10 %
1 GEL (GRAM) MUCOUS MEMBRANE
Qty: 0 | Refills: 0 | DISCHARGE
Start: 2020-05-14

## 2020-05-14 RX ADMIN — Medication 975 MILLIGRAM(S): at 09:34

## 2020-05-14 RX ADMIN — Medication 975 MILLIGRAM(S): at 02:01

## 2020-05-14 RX ADMIN — Medication 600 MILLIGRAM(S): at 06:11

## 2020-05-14 RX ADMIN — Medication 600 MILLIGRAM(S): at 12:30

## 2020-05-14 NOTE — DISCHARGE NOTE OB - PATIENT PORTAL LINK FT
You can access the FollowMyHealth Patient Portal offered by [facility] by registering at the following website: [email or web address]. By joining [employer]’s FollowMyHealth portal, you will also be able to view your health information using other applications (apps) compatible with our system.

## 2020-05-14 NOTE — PROGRESS NOTE ADULT - SUBJECTIVE AND OBJECTIVE BOX
OB attending  PPD #1    Pt doing well, pain well controlled. No overnight events, no acute complaints.    Ambulating: Yes  Voiding: Yes  Flatus: Yes  Bowel movements: Yes   Breast or bottle feeding: Breastfeeding  Diet: Regular    PAST MEDICAL & SURGICAL HISTORY:  Gestational diabetes  No significant past surgical history      Physical Exam  Vital Signs Last 24 Hrs  T(C): 35.9 (14 May 2020 08:03), Max: 36.6 (13 May 2020 09:36)  T(F): 96.7 (14 May 2020 08:03), Max: 97.8 (13 May 2020 09:36)  HR: 55 (14 May 2020 08:03) (55 - 79)  BP: 89/49 (14 May 2020 08:03) (89/49 - 115/71)  BP(mean): --  RR: 18 (14 May 2020 08:03) (18 - 20)  SpO2: 96% (14 May 2020 08:03) (96% - 99%)  Gen: AAOx3, NAD  Abd: Soft, nontender, nondistended, BS+  Fundus: Firm, below umbilicus  Lochia: normal  Ext: No calf tenderness, no swelling    Labs:                        11.2   7.42  )-----------( 115      ( 13 May 2020 21:01 )             33.4   fs114 this am         A/P: yo G P @ wks, s/p , PPD #1, doing well  - continue current management  anticipate d/c home

## 2020-05-14 NOTE — DISCHARGE NOTE OB - HOSPITAL COURSE
Patient admitted for IOL, underwent uncomplicated vaginal delivery and had an uncomplicated postpartum course, discharged on PPD 1.

## 2020-05-14 NOTE — DISCHARGE NOTE OB - CARE PROVIDER_API CALL
Dontrell Cat  OBSTETRICS AND GYNECOLOGY  [address] [place] [address]  Phone: [phone]  Fax: [phone]  Follow Up Time:

## 2020-05-18 DIAGNOSIS — Z3A.38 38 WEEKS GESTATION OF PREGNANCY: ICD-10-CM

## 2020-05-18 DIAGNOSIS — E11.65 TYPE 2 DIABETES MELLITUS WITH HYPERGLYCEMIA: ICD-10-CM

## 2020-05-18 DIAGNOSIS — Z79.4 LONG TERM (CURRENT) USE OF INSULIN: ICD-10-CM

## 2020-05-18 DIAGNOSIS — O40.3XX0 POLYHYDRAMNIOS, THIRD TRIMESTER, NOT APPLICABLE OR UNSPECIFIED: ICD-10-CM

## 2020-06-01 ENCOUNTER — OUTPATIENT (OUTPATIENT)
Dept: OUTPATIENT SERVICES | Facility: HOSPITAL | Age: 40
LOS: 1 days | End: 2020-06-01
Payer: MEDICAID

## 2020-06-02 DIAGNOSIS — Z71.89 OTHER SPECIFIED COUNSELING: ICD-10-CM

## 2020-06-02 PROBLEM — O24.419 GESTATIONAL DIABETES MELLITUS IN PREGNANCY, UNSPECIFIED CONTROL: Chronic | Status: ACTIVE | Noted: 2020-05-13

## 2020-06-15 PROCEDURE — G9001: CPT

## 2022-06-22 NOTE — OB PROVIDER H&P - NSOUTCOME4_OBGYN_ALL_OB
Dx with enlarged lymphnodes to left groin, and UTI in April. + pain to left groin for the past 2 weeks, only when getting up to walk.       Reports today when out of the house it was much worse Term

## 2024-04-23 NOTE — OB RN DELIVERY SUMMARY - NS_CORDBLDTYPEA_OBGYN_ALL_OB
Please let patient know that I can only guarantee to work with her to the best of my ability, I can not guarantee results.    I continue to recommend that we increase Prozac to 80 mg a day but if patient disagrees with this that is no problem at all.  The referral to a psychotherapist is a good idea and I hope patient follows through with this.      Finally and respectfully, if patient is dissatisfied with the care that I am providing she is welcome to seek out care elsewhere.      Thank you.   Yes